# Patient Record
Sex: FEMALE | Race: BLACK OR AFRICAN AMERICAN | NOT HISPANIC OR LATINO | Employment: FULL TIME | URBAN - METROPOLITAN AREA
[De-identification: names, ages, dates, MRNs, and addresses within clinical notes are randomized per-mention and may not be internally consistent; named-entity substitution may affect disease eponyms.]

---

## 2022-03-19 ENCOUNTER — IMMUNIZATION (OUTPATIENT)
Dept: PRIMARY CARE CLINIC | Facility: CLINIC | Age: 52
End: 2022-03-19
Payer: COMMERCIAL

## 2022-03-19 DIAGNOSIS — Z23 NEED FOR VACCINATION: Primary | ICD-10-CM

## 2022-03-19 PROCEDURE — 91306 COVID-19, MRNA, LNP-S, PF, 100 MCG/0.25 ML DOSE VACCINE (MODERNA BOOSTER): CPT | Mod: PBBFAC | Performed by: INTERNAL MEDICINE

## 2022-03-19 PROCEDURE — 0064A COVID-19, MRNA, LNP-S, PF, 100 MCG/0.25 ML DOSE VACCINE (MODERNA BOOSTER): CPT | Mod: CV19,PBBFAC | Performed by: INTERNAL MEDICINE

## 2022-06-11 ENCOUNTER — CLINICAL SUPPORT (OUTPATIENT)
Dept: OTHER | Facility: CLINIC | Age: 52
End: 2022-06-11

## 2022-06-11 DIAGNOSIS — Z00.8 ENCOUNTER FOR OTHER GENERAL EXAMINATION: ICD-10-CM

## 2022-06-15 VITALS
SYSTOLIC BLOOD PRESSURE: 118 MMHG | BODY MASS INDEX: 27.85 KG/M2 | DIASTOLIC BLOOD PRESSURE: 80 MMHG | WEIGHT: 188 LBS | HEIGHT: 69 IN

## 2022-06-15 LAB
GLUCOSE SERPL-MCNC: 83 MG/DL (ref 60–140)
HDLC SERPL-MCNC: 67 MG/DL
POC CHOLESTEROL, TOTAL: 174 MG/DL
TRIGL SERPL-MCNC: 44 MG/DL

## 2022-09-17 ENCOUNTER — IMMUNIZATION (OUTPATIENT)
Dept: PRIMARY CARE CLINIC | Facility: CLINIC | Age: 52
End: 2022-09-17
Payer: COMMERCIAL

## 2022-09-17 ENCOUNTER — CLINICAL SUPPORT (OUTPATIENT)
Dept: OTHER | Facility: CLINIC | Age: 52
End: 2022-09-17

## 2022-09-17 DIAGNOSIS — Z23 NEED FOR VACCINATION: Primary | ICD-10-CM

## 2022-09-17 DIAGNOSIS — Z00.8 ENCOUNTER FOR OTHER GENERAL EXAMINATION: ICD-10-CM

## 2022-09-17 PROCEDURE — 0134A COVID-19, MRNA, LNP-S, BIVALENT BOOSTER, PF, 50 MCG/0.5 ML: CPT | Mod: CV19,PBBFAC | Performed by: INTERNAL MEDICINE

## 2022-09-17 PROCEDURE — 91313 COVID-19, MRNA, LNP-S, BIVALENT BOOSTER, PF, 50 MCG/0.5 ML: CPT | Mod: PBBFAC | Performed by: INTERNAL MEDICINE

## 2022-09-18 VITALS — SYSTOLIC BLOOD PRESSURE: 127 MMHG | DIASTOLIC BLOOD PRESSURE: 75 MMHG

## 2022-09-18 LAB
GLUCOSE SERPL-MCNC: 80 MG/DL (ref 60–140)
HDLC SERPL-MCNC: 73 MG/DL
POC CHOLESTEROL, LDL (DOCK): 99 MG/DL
POC CHOLESTEROL, TOTAL: 188 MG/DL
TRIGL SERPL-MCNC: 90 MG/DL

## 2023-03-11 ENCOUNTER — CLINICAL SUPPORT (OUTPATIENT)
Dept: OTHER | Facility: CLINIC | Age: 53
End: 2023-03-11
Payer: COMMERCIAL

## 2023-03-11 DIAGNOSIS — Z00.8 ENCOUNTER FOR OTHER GENERAL EXAMINATION: ICD-10-CM

## 2023-03-12 VITALS — DIASTOLIC BLOOD PRESSURE: 81 MMHG | SYSTOLIC BLOOD PRESSURE: 145 MMHG

## 2023-03-12 LAB
GLUCOSE SERPL-MCNC: 89 MG/DL (ref 60–140)
HDLC SERPL-MCNC: 67 MG/DL
POC CHOLESTEROL, TOTAL: 183 MG/DL
TRIGL SERPL-MCNC: 44 MG/DL

## 2023-04-06 ENCOUNTER — CLINICAL SUPPORT (OUTPATIENT)
Dept: OTHER | Facility: CLINIC | Age: 53
End: 2023-04-06
Payer: COMMERCIAL

## 2023-04-06 DIAGNOSIS — Z00.8 ENCOUNTER FOR OTHER GENERAL EXAMINATION: ICD-10-CM

## 2023-04-06 PROCEDURE — 82947 PR  ASSAY QUANTITATIVE,BLOOD GLUCOSE: ICD-10-PCS | Mod: QW,S$GLB,, | Performed by: INTERNAL MEDICINE

## 2023-04-06 PROCEDURE — 99401 PR PREVENT COUNSEL,INDIV,15 MIN: ICD-10-PCS | Mod: S$GLB,,, | Performed by: INTERNAL MEDICINE

## 2023-04-06 PROCEDURE — 99401 PREV MED CNSL INDIV APPRX 15: CPT | Mod: S$GLB,,, | Performed by: INTERNAL MEDICINE

## 2023-04-06 PROCEDURE — 82947 ASSAY GLUCOSE BLOOD QUANT: CPT | Mod: QW,S$GLB,, | Performed by: INTERNAL MEDICINE

## 2023-04-06 PROCEDURE — 80061 PR  LIPID PANEL: ICD-10-PCS | Mod: QW,S$GLB,, | Performed by: INTERNAL MEDICINE

## 2023-04-06 PROCEDURE — 80061 LIPID PANEL: CPT | Mod: QW,S$GLB,, | Performed by: INTERNAL MEDICINE

## 2023-04-07 VITALS
BODY MASS INDEX: 30.07 KG/M2 | SYSTOLIC BLOOD PRESSURE: 142 MMHG | HEIGHT: 69 IN | WEIGHT: 203 LBS | DIASTOLIC BLOOD PRESSURE: 82 MMHG

## 2023-04-07 LAB
GLUCOSE SERPL-MCNC: 110 MG/DL (ref 60–140)
HDLC SERPL-MCNC: 65 MG/DL
POC CHOLESTEROL, TOTAL: 177 MG/DL
TRIGL SERPL-MCNC: 44 MG/DL

## 2024-09-14 ENCOUNTER — CLINICAL SUPPORT (OUTPATIENT)
Dept: OTHER | Facility: CLINIC | Age: 54
End: 2024-09-14

## 2024-09-14 DIAGNOSIS — Z00.8 ENCOUNTER FOR OTHER GENERAL EXAMINATION: ICD-10-CM

## 2024-09-17 LAB
GLUCOSE SERPL-MCNC: 106 MG/DL (ref 60–140)
HDLC SERPL-MCNC: 58 MG/DL
POC CHOLESTEROL, LDL (DOCK): 132 MG/DL
POC CHOLESTEROL, TOTAL: 200 MG/DL
TRIGL SERPL-MCNC: 54 MG/DL

## 2024-10-18 ENCOUNTER — LAB VISIT (OUTPATIENT)
Dept: LAB | Facility: HOSPITAL | Age: 54
End: 2024-10-18
Attending: STUDENT IN AN ORGANIZED HEALTH CARE EDUCATION/TRAINING PROGRAM
Payer: COMMERCIAL

## 2024-10-18 ENCOUNTER — OFFICE VISIT (OUTPATIENT)
Dept: PRIMARY CARE CLINIC | Facility: CLINIC | Age: 54
End: 2024-10-18
Payer: COMMERCIAL

## 2024-10-18 VITALS
BODY MASS INDEX: 29.71 KG/M2 | WEIGHT: 200.63 LBS | DIASTOLIC BLOOD PRESSURE: 80 MMHG | HEART RATE: 87 BPM | OXYGEN SATURATION: 97 % | SYSTOLIC BLOOD PRESSURE: 138 MMHG | TEMPERATURE: 99 F | HEIGHT: 69 IN

## 2024-10-18 DIAGNOSIS — Z13.1 SCREENING FOR DIABETES MELLITUS: ICD-10-CM

## 2024-10-18 DIAGNOSIS — Z80.3 FAMILY HISTORY OF BREAST CANCER IN SISTER: ICD-10-CM

## 2024-10-18 DIAGNOSIS — Z12.31 SCREENING MAMMOGRAM FOR BREAST CANCER: ICD-10-CM

## 2024-10-18 DIAGNOSIS — Z12.4 SCREENING FOR CERVICAL CANCER: ICD-10-CM

## 2024-10-18 DIAGNOSIS — Z13.220 SCREENING FOR HYPERLIPIDEMIA: ICD-10-CM

## 2024-10-18 DIAGNOSIS — I10 PRIMARY HYPERTENSION: ICD-10-CM

## 2024-10-18 DIAGNOSIS — Z00.00 ENCOUNTER FOR PREVENTATIVE ADULT HEALTH CARE EXAMINATION: Primary | ICD-10-CM

## 2024-10-18 DIAGNOSIS — Z00.00 ENCOUNTER FOR PREVENTATIVE ADULT HEALTH CARE EXAMINATION: ICD-10-CM

## 2024-10-18 LAB
ALBUMIN SERPL BCP-MCNC: 3.9 G/DL (ref 3.5–5.2)
ALP SERPL-CCNC: 65 U/L (ref 40–150)
ALT SERPL W/O P-5'-P-CCNC: 18 U/L (ref 10–44)
ANION GAP SERPL CALC-SCNC: 9 MMOL/L (ref 8–16)
AST SERPL-CCNC: 18 U/L (ref 10–40)
BILIRUB SERPL-MCNC: 0.5 MG/DL (ref 0.1–1)
BUN SERPL-MCNC: 10 MG/DL (ref 6–20)
CALCIUM SERPL-MCNC: 9.4 MG/DL (ref 8.7–10.5)
CHLORIDE SERPL-SCNC: 107 MMOL/L (ref 95–110)
CHOLEST SERPL-MCNC: 178 MG/DL (ref 120–199)
CHOLEST/HDLC SERPL: 3 {RATIO} (ref 2–5)
CO2 SERPL-SCNC: 23 MMOL/L (ref 23–29)
CREAT SERPL-MCNC: 0.9 MG/DL (ref 0.5–1.4)
ERYTHROCYTE [DISTWIDTH] IN BLOOD BY AUTOMATED COUNT: 13.2 % (ref 11.5–14.5)
EST. GFR  (NO RACE VARIABLE): >60 ML/MIN/1.73 M^2
ESTIMATED AVG GLUCOSE: 105 MG/DL (ref 68–131)
GLUCOSE SERPL-MCNC: 79 MG/DL (ref 70–110)
HBA1C MFR BLD: 5.3 % (ref 4–5.6)
HCT VFR BLD AUTO: 45.7 % (ref 37–48.5)
HDLC SERPL-MCNC: 59 MG/DL (ref 40–75)
HDLC SERPL: 33.1 % (ref 20–50)
HGB BLD-MCNC: 14.5 G/DL (ref 12–16)
LDLC SERPL CALC-MCNC: 110.8 MG/DL (ref 63–159)
MCH RBC QN AUTO: 30.7 PG (ref 27–31)
MCHC RBC AUTO-ENTMCNC: 31.7 G/DL (ref 32–36)
MCV RBC AUTO: 97 FL (ref 82–98)
NONHDLC SERPL-MCNC: 119 MG/DL
PLATELET # BLD AUTO: 218 K/UL (ref 150–450)
PMV BLD AUTO: 12.1 FL (ref 9.2–12.9)
POTASSIUM SERPL-SCNC: 4.1 MMOL/L (ref 3.5–5.1)
PROT SERPL-MCNC: 7.5 G/DL (ref 6–8.4)
RBC # BLD AUTO: 4.72 M/UL (ref 4–5.4)
SODIUM SERPL-SCNC: 139 MMOL/L (ref 136–145)
TRIGL SERPL-MCNC: 41 MG/DL (ref 30–150)
TSH SERPL DL<=0.005 MIU/L-ACNC: 1.22 UIU/ML (ref 0.4–4)
WBC # BLD AUTO: 4.46 K/UL (ref 3.9–12.7)

## 2024-10-18 PROCEDURE — 84443 ASSAY THYROID STIM HORMONE: CPT | Performed by: STUDENT IN AN ORGANIZED HEALTH CARE EDUCATION/TRAINING PROGRAM

## 2024-10-18 PROCEDURE — 36415 COLL VENOUS BLD VENIPUNCTURE: CPT | Mod: PN | Performed by: STUDENT IN AN ORGANIZED HEALTH CARE EDUCATION/TRAINING PROGRAM

## 2024-10-18 PROCEDURE — 80053 COMPREHEN METABOLIC PANEL: CPT | Performed by: STUDENT IN AN ORGANIZED HEALTH CARE EDUCATION/TRAINING PROGRAM

## 2024-10-18 PROCEDURE — 80061 LIPID PANEL: CPT | Performed by: STUDENT IN AN ORGANIZED HEALTH CARE EDUCATION/TRAINING PROGRAM

## 2024-10-18 PROCEDURE — 85027 COMPLETE CBC AUTOMATED: CPT | Performed by: STUDENT IN AN ORGANIZED HEALTH CARE EDUCATION/TRAINING PROGRAM

## 2024-10-18 PROCEDURE — 99999 PR PBB SHADOW E&M-EST. PATIENT-LVL V: CPT | Mod: PBBFAC,,, | Performed by: STUDENT IN AN ORGANIZED HEALTH CARE EDUCATION/TRAINING PROGRAM

## 2024-10-18 PROCEDURE — 83036 HEMOGLOBIN GLYCOSYLATED A1C: CPT | Performed by: STUDENT IN AN ORGANIZED HEALTH CARE EDUCATION/TRAINING PROGRAM

## 2024-10-18 RX ORDER — NORETHINDRONE 0.35 MG/1
1 TABLET ORAL DAILY
COMMUNITY

## 2024-10-18 NOTE — PATIENT INSTRUCTIONS
For high blood pressure     Try DASH diet  Try to limit daily sodium to 2,000 mg   Limit alcohol intake and caffeine intake   Exercise at least 90 minutes/week       [email or web address]

## 2024-10-18 NOTE — PROGRESS NOTES
Primary Care  Annual Office Visit - In Person  10/18/2024          Patient is a 54 y.o.   Dayanara Guo  has no past medical history on file.    History of Present Illness    CHIEF COMPLAINT:  Patient presents today for annual check-up.    HYPERTENSION:  She monitors her blood pressure at home and has been mindful of her salt intake. She walks around her large yard for exercise but no longer has access to a previously used walking trail. She understands that her blood pressure may naturally increase with age.    MEDICATIONS:  She takes birth control for fibroid management as recommended by her previous doctor, but expresses concern about potential cancer risk associated with its use.    FAMILY HISTORY:  Her sister was diagnosed with breast cancer at age 40 and underwent bilateral mastectomy. She is now 54. They have different fathers.  No other family history of cancer reported.    SOCIAL HISTORY:  She works as a .    DIET AND LIFESTYLE:  She denies alcohol consumption     PREVENTIVE CARE:  Recent Cologuard test for colon cancer screening was normal.     PAST MEDICAL HISTORY:  She previously received care at [facility]      Active Medications  Current Outpatient Medications   Medication Instructions    BIOTIN ORAL Take by mouth.    CALCIUM CARBONATE/VITAMIN D3 (CALCIUM 600 + D,3, ORAL) Take by mouth.    influenza (FLUARIX TRIV 5846-1296, PF,) 45 mcg (15 mcg x 3)/0.5 mL IM vaccine (> or = 6 mo) 0.5 mLs, Intramuscular, Once    MULTIVITAMIN ORAL Take by mouth.    norethindrone (MICRONOR) 0.35 mg tablet 1 tablet, Daily       Annual Review of Preventative Care      Health Maintenance Due   Topic Date Due    Hepatitis C Screening  Never done    HIV Screening  Never done    Hemoglobin A1c (Diabetic Prevention Screening)  Never done    Colorectal Cancer Screening  Never done    Cervical Cancer Screening  05/16/2019    Mammogram  06/08/2024    Influenza Vaccine (1) 09/01/2024    COVID-19  "Vaccine (4 - 2024-25 season) 09/01/2024     Diabetes Screening:  No results found for: "LABGLYC", "HEMOGLOBINA1", "HGBA1C"  BP Readings from Last 3 Encounters:   10/18/24 138/80   09/14/24 (!) 151/81   04/06/23 (!) 142/82     Wt Readings from Last 3 Encounters:   10/18/24 0859 91 kg (200 lb 9.9 oz)   04/06/23 0930 92.1 kg (203 lb)   06/11/22 1057 85.3 kg (188 lb)     Colon Cancer Screening: TBD   Mammo: Next Due 2024  Pap: Next Due 2024          Physical Exam  Vitals reviewed.   Constitutional:       General: She is not in acute distress.  Eyes:      Pupils: Pupils are equal, round, and reactive to light.   Cardiovascular:      Rate and Rhythm: Normal rate and regular rhythm.      Pulses: Normal pulses.      Heart sounds: Normal heart sounds.   Pulmonary:      Effort: Pulmonary effort is normal.      Breath sounds: Normal breath sounds.   Abdominal:      General: Abdomen is flat. Bowel sounds are normal.      Palpations: Abdomen is soft.   Musculoskeletal:      Right lower leg: No edema.      Left lower leg: No edema.                   Assessment & Plan      Diagnosed hypertension based on multiple elevated blood pressure readings  Considered lifestyle modifications before initiating pharmacological treatment for hypertension  Evaluated current sodium intake and recommended reduction  Reviewed exercise habits and encouraged gradual increase in physical activity  Noted history of fibroids, previously managed with low-dose birth control  Considered family history of breast cancer in sister    HYPERTENSION:  - Patient to reduce sodium intake, being mindful of hidden sources in processed foods.  - Patient to bring wrist blood pressure cuff to next appointment for comparison with office readings.  - Follow up in 1 month for blood pressure check.  - If blood pressure remains elevated at follow-up, medication initiation will be considered.    EXERCISE MANAGEMENT:  - Discussed the importance of gradual increase in exercise " frequency.    BREAST CANCER SCREENING:  - Mammogram ordered.    COLON CANCER SCREENING:  - Will obtain records     ENCOUNTER FOR PREVENTIVE CARE:  - CBC  - BMP  - TSH    SCREENING HLD:  - Lipid panel     SCREENING DM:  - HbA1C    FLU VACCINATION:  - Flu vaccine     GYNECOLOGY REFERRAL:  - Referred to gynecology for establishment of care and Pap smear.           Orders Placed This Encounter    Mammo Digital Screening Bilat    Lipid Panel    Hemoglobin A1C    CBC Without Differential    Comprehensive Metabolic Panel    TSH    Ambulatory referral/consult to Gynecology                             Upcoming Scheduled Appointments and Follow Up:    Future Appointments   Date Time Provider Department Center   11/5/2024 10:00 AM [facility] [place]   11/27/2024  8:20 AM Cuong Buitrago MD [facility]   11/27/2024  9:40 AM Antione Goins NP [facility]       Follow Up [facility] (with who? when?): Follow up in about 1 month (around 11/18/2024) for 40 mins.        Extended Emergency Contact Information  Primary Emergency Contact: Razia Pierre   [place]  Mobile Phone: [phone]  Relation: Relative      Cuong Buitrago MD   Internal Medicine  10/18/2024 - 11:05 AM    I spent a total of 30 minutes on the day of the visit.This includes face to face time and non-face to face time preparing to see the patient (eg, review of tests), obtaining and/or reviewing separately obtained history, documenting clinical information in the electronic or other health record, independently interpreting results and communicating results to the patient/family/caregiver, or care coordinator.    This note was generated with the assistance of ambient listening technology. Verbal consent was obtained by the patient and accompanying visitor(s) for the recording of patient appointment to facilitate this note. I attest to having reviewed and edited the generated note for  accuracy, though some syntax or spelling errors may persist. Please contact the author of this note for any clarification.      While patients have the right to access their medical record, it is essential to recognize that progress notes primarily serve as a means of communication among healthcare professionals.

## 2024-11-05 ENCOUNTER — HOSPITAL ENCOUNTER (OUTPATIENT)
Dept: RADIOLOGY | Facility: HOSPITAL | Age: 54
Discharge: HOME | End: 2024-11-05
Attending: STUDENT IN AN ORGANIZED HEALTH CARE EDUCATION/TRAINING PROGRAM
Payer: COMMERCIAL

## 2024-11-05 VITALS — WEIGHT: 200 LBS | HEIGHT: 69 IN | BODY MASS INDEX: 29.62 KG/M2

## 2024-11-05 DIAGNOSIS — Z12.31 SCREENING MAMMOGRAM FOR BREAST CANCER: ICD-10-CM

## 2024-11-05 PROCEDURE — 77067 SCR MAMMO BI INCL CAD: CPT | Mod: TC

## 2024-11-22 ENCOUNTER — PATIENT MESSAGE (OUTPATIENT)
Dept: RESEARCH | Facility: HOSPITAL | Age: 54
End: 2024-11-22
Payer: COMMERCIAL

## 2024-11-26 ENCOUNTER — HOSPITAL ENCOUNTER (OUTPATIENT)
Dept: RADIOLOGY | Facility: HOSPITAL | Age: 54
Discharge: HOME | End: 2024-11-26
Attending: STUDENT IN AN ORGANIZED HEALTH CARE EDUCATION/TRAINING PROGRAM
Payer: COMMERCIAL

## 2024-11-26 DIAGNOSIS — R92.8 ABNORMAL MAMMOGRAM: ICD-10-CM

## 2024-11-26 PROCEDURE — 77066 DX MAMMO INCL CAD BI: CPT | Mod: 26,,, | Performed by: RADIOLOGY

## 2024-11-26 PROCEDURE — 77066 DX MAMMO INCL CAD BI: CPT | Mod: TC

## 2024-11-26 PROCEDURE — 77062 BREAST TOMOSYNTHESIS BI: CPT | Mod: 26,,, | Performed by: RADIOLOGY

## 2024-11-26 PROCEDURE — 76642 ULTRASOUND BREAST LIMITED: CPT | Mod: TC,50

## 2024-11-26 PROCEDURE — 76642 ULTRASOUND BREAST LIMITED: CPT | Mod: 26,,, | Performed by: RADIOLOGY

## 2024-11-27 ENCOUNTER — PATIENT MESSAGE (OUTPATIENT)
Dept: RESEARCH | Facility: HOSPITAL | Age: 54
End: 2024-11-27
Payer: COMMERCIAL

## 2024-11-27 ENCOUNTER — OFFICE VISIT (OUTPATIENT)
Dept: OBSTETRICS AND GYNECOLOGY | Facility: CLINIC | Age: 54
End: 2024-11-27
Payer: COMMERCIAL

## 2024-11-27 ENCOUNTER — OFFICE VISIT (OUTPATIENT)
Dept: PRIMARY CARE CLINIC | Facility: CLINIC | Age: 54
End: 2024-11-27
Payer: COMMERCIAL

## 2024-11-27 VITALS
DIASTOLIC BLOOD PRESSURE: 81 MMHG | WEIGHT: 195.88 LBS | DIASTOLIC BLOOD PRESSURE: 76 MMHG | SYSTOLIC BLOOD PRESSURE: 132 MMHG | WEIGHT: 196.19 LBS | BODY MASS INDEX: 29.06 KG/M2 | BODY MASS INDEX: 28.93 KG/M2 | HEIGHT: 69 IN | SYSTOLIC BLOOD PRESSURE: 122 MMHG

## 2024-11-27 DIAGNOSIS — Z86.018 HISTORY OF UTERINE FIBROID: ICD-10-CM

## 2024-11-27 DIAGNOSIS — R45.86 MOOD CHANGES: ICD-10-CM

## 2024-11-27 DIAGNOSIS — Z12.4 ENCOUNTER FOR SCREENING FOR MALIGNANT NEOPLASM OF CERVIX: ICD-10-CM

## 2024-11-27 DIAGNOSIS — Z01.419 ENCOUNTER FOR GYNECOLOGICAL EXAMINATION: Primary | ICD-10-CM

## 2024-11-27 DIAGNOSIS — J30.2 SEASONAL ALLERGIC RHINITIS, UNSPECIFIED TRIGGER: Primary | ICD-10-CM

## 2024-11-27 DIAGNOSIS — Z80.3 FAMILY HISTORY OF BREAST CANCER: ICD-10-CM

## 2024-11-27 DIAGNOSIS — Z12.39 ENCOUNTER FOR SCREENING BREAST EXAMINATION: ICD-10-CM

## 2024-11-27 DIAGNOSIS — N95.1 PERIMENOPAUSE: ICD-10-CM

## 2024-11-27 DIAGNOSIS — I10 PRIMARY HYPERTENSION: ICD-10-CM

## 2024-11-27 PROCEDURE — 99214 OFFICE O/P EST MOD 30 MIN: CPT | Mod: S$GLB,,, | Performed by: STUDENT IN AN ORGANIZED HEALTH CARE EDUCATION/TRAINING PROGRAM

## 2024-11-27 PROCEDURE — 3078F DIAST BP <80 MM HG: CPT | Mod: CPTII,S$GLB,, | Performed by: STUDENT IN AN ORGANIZED HEALTH CARE EDUCATION/TRAINING PROGRAM

## 2024-11-27 PROCEDURE — 99999 PR PBB SHADOW E&M-EST. PATIENT-LVL III: CPT | Mod: PBBFAC,,, | Performed by: STUDENT IN AN ORGANIZED HEALTH CARE EDUCATION/TRAINING PROGRAM

## 2024-11-27 PROCEDURE — 3074F SYST BP LT 130 MM HG: CPT | Mod: CPTII,S$GLB,, | Performed by: STUDENT IN AN ORGANIZED HEALTH CARE EDUCATION/TRAINING PROGRAM

## 2024-11-27 PROCEDURE — 3008F BODY MASS INDEX DOCD: CPT | Mod: CPTII,S$GLB,, | Performed by: STUDENT IN AN ORGANIZED HEALTH CARE EDUCATION/TRAINING PROGRAM

## 2024-11-27 PROCEDURE — 3044F HG A1C LEVEL LT 7.0%: CPT | Mod: CPTII,S$GLB,, | Performed by: STUDENT IN AN ORGANIZED HEALTH CARE EDUCATION/TRAINING PROGRAM

## 2024-11-27 PROCEDURE — 99999 PR PBB SHADOW E&M-EST. PATIENT-LVL IV: CPT | Mod: PBBFAC,,, | Performed by: NURSE PRACTITIONER

## 2024-11-27 RX ORDER — LEVOCETIRIZINE DIHYDROCHLORIDE 5 MG/1
5 TABLET, FILM COATED ORAL NIGHTLY PRN
Qty: 30 TABLET | Refills: 11 | Status: SHIPPED | OUTPATIENT
Start: 2024-11-27

## 2024-11-27 RX ORDER — PAROXETINE HYDROCHLORIDE 20 MG/1
20 TABLET, FILM COATED ORAL EVERY MORNING
Qty: 30 TABLET | Refills: 11 | Status: SHIPPED | OUTPATIENT
Start: 2024-11-27 | End: 2025-11-27

## 2024-11-27 RX ORDER — FLUTICASONE PROPIONATE 50 MCG
1 SPRAY, SUSPENSION (ML) NASAL DAILY PRN
Qty: 15.8 ML | Refills: 1 | Status: SHIPPED | OUTPATIENT
Start: 2024-11-27

## 2024-11-27 NOTE — PROGRESS NOTES
HISTORY OF PRESENT ILLNESS:    Dayanara Guo is a 54 y.o. female, , Patient's last menstrual period was 2024 (approximate).,  presents for a routine exam. Patient reports cycles are sporadic lasting 4 days with some spotting, lighter in flow. Pt reports hot flashes, not too bothersome. Pt also reports mood swings. Pt denies breast, urinary or other gyn issues.    Last Pap: 2016- NILM  History of abnormal pap: No   Last Mammogram: 2024- right breast: lower outer quadrant 08:00 o'clock oval simple cyst measuring 1.0 cm long axis & 10:00 o'clock axis 4 cm from the nipple oval simple cyst measuring 2.7 cm long axis; left breast: 04:00 o'clock axis 5 cm from the nipple oval simple cyst measuring 1.1 cm & 04:00 o'clock axis 6 cm from the nipple oval simple cyst measuring 1.1 cm long; Benign (BIRADS 2), TC score: 29.1%    Fam hx of breast or ovarian cancer: Sister (breast)  Last Colonoscopy: Cologuard - negative    She uses abstinence for birth control.   She does not desire STD screening.    The patient participates in regular exercise: yes  The patient does not smoke.    The patient wears seatbelts.     Pt denies any domestic violence.    History reviewed. No pertinent past medical history.    Past Surgical History:   Procedure Laterality Date     SECTION       MEDICATIONS AND ALLERGIES:    Current Outpatient Medications:     ascorbic acid, vitamin C, (VITAMIN C) 100 MG tablet, Take 100 mg by mouth once daily., Disp: , Rfl:     CALCIUM CARBONATE/VITAMIN D3 (CALCIUM 600 + D,3, ORAL), Take by mouth., Disp: , Rfl:     fluticasone propionate (FLONASE) 50 mcg/actuation nasal spray, 1 spray (50 mcg total) by Each Nostril route daily as needed for Rhinitis., Disp: 15.8 mL, Rfl: 1    levocetirizine (XYZAL) 5 MG tablet, Take 1 tablet (5 mg total) by mouth nightly as needed for Allergies., Disp: 30 tablet, Rfl: 11    MULTIVITAMIN ORAL, Take by mouth., Disp: , Rfl:     BIOTIN ORAL, Take by  mouth. (Patient not taking: Reported on 11/27/2024), Disp: , Rfl:     norethindrone (MICRONOR) 0.35 mg tablet, Take 1 tablet by mouth once daily. (Patient not taking: Reported on 11/27/2024), Disp: , Rfl:     paroxetine (PAXIL) 20 MG tablet, Take 1 tablet (20 mg total) by mouth every morning., Disp: 30 tablet, Rfl: 11    Review of patient's allergies indicates:  No Known Allergies    Family History   Problem Relation Name Age of Onset    Arthritis Mother      Breast cancer Sister       Social History     Socioeconomic History    Marital status: Single    Number of children: 2   Tobacco Use    Smoking status: Never   Substance and Sexual Activity    Alcohol use: No    Drug use: No    Sexual activity: Not Currently     Partners: Male     COMPREHENSIVE GYN HISTORY:  PAP History: Denies abnormal Paps.  Infection History: Denies STDs. Denies PID.  Benign History: Denies uterine fibroids. Denies ovarian cysts. Denies endometriosis. Denies other conditions.  Cancer History: Denies cervical cancer. Denies uterine cancer or hyperplasia. Denies ovarian cancer. Denies vulvar cancer or pre-cancer. Denies vaginal cancer or pre-cancer. Denies breast cancer. Denies colon cancer.  Sexual Activity History: Reports currently being sexually active  Menstrual History: Sporadic with light flow, spotting at times  Dysmenorrhea History: Mild cramping     ROS:  GENERAL: No weight changes. No swelling. No fatigue. No fever.  CARDIOVASCULAR: No chest pain. No shortness of breath. No leg cramps.   NEUROLOGICAL: No headaches. No vision changes.  BREASTS: No pain. No lumps. No discharge.  ABDOMEN: No pain. No nausea. No vomiting. No diarrhea. No constipation.  REPRODUCTIVE: No abnormal bleeding.   VULVA: No pain. No lesions. No itching.  VAGINA: No relaxation. No itching. No odor. No discharge. No lesions.  URINARY: No incontinence. No nocturia. No frequency. No dysuria.    /81 (BP Location: Left arm, Patient Position: Sitting)   Wt 88.9  kg (195 lb 14.1 oz)   LMP 11/13/2024 (Approximate)   BMI 28.93 kg/m²     PE:  APPEARANCE: Well nourished, well developed, in no acute distress.  AFFECT: WNL, alert and oriented x 3.  SKIN: No acne or hirsutism.  NECK: Neck symmetric, without masses or thyromegaly.  NODES: No inguinal, cervical, axillary or femoral lymph node enlargement.  CHEST: Good respiratory effort.   ABDOMEN: Soft. No tenderness or masses. No hepatosplenomegaly. No hernias.  BREASTS: Symmetrical, no skin changes, visible lesions, palpable masses or nipple discharge bilaterally.  PELVIC: External female genitalia without lesions.  Female hair distribution. Adequate perineal body, Normal urethral meatus. Vagina moist and well rugated without lesions or discharge.  No significant cystocele or rectocele present. Cervix pink without lesions, discharge or tenderness. Uterus is enlarged with hard large nodules noted, nontender. Adnexa without masses or tenderness. PAP SMEAR PERFORMED TODAY  EXTREMITIES: No edema    DIAGNOSIS:  1. Encounter for gynecological examination    2. Encounter for screening breast examination    3. Encounter for screening for malignant neoplasm of cervix    4. Family history of breast cancer    5. Perimenopause    6. Mood changes    7. History of uterine fibroid      PLAN:  -Pap smear performed today, A.C.S. pap guidelines discussed (every 3 yrs with hx of normal paps).   -CBE performed today. Mammogram up-to-date. Recommend CBE yearly and encouraged breast self-exam/awareness. Pt verbalized understanding. Sent referral to high risk breast clinic for elevated TC score/fam hx of breast cancer  -BC: abstinence  -Cologuard performed this year; repeat in 2027  -Uterine fibroid: discussed pelvic U/S, pt will request order in 6 months  -Vasomotor symptoms of perimenopause/mood changes: will send paxil to pharmacy; discussed benefits, risks, side effects, proper use    Orders Placed This Encounter    HPV High Risk Genotypes, PCR     Ambulatory referral/consult to [facility] Clinic ([facility])    Liquid-Based Pap Smear, Screening    paroxetine (PAXIL) 20 MG tablet     COUNSELING:  A.C.S. pap guidelines discussed (every 3 yrs with hx of normal paps). Recommend yearly mammograms.     FOLLOW-UP with me annually and follow up with PCP for other health maintenance.    PROBLEM VISIT INFO:  Dx: Vasomotor sym of perimenopause, Mood changes: Dx codes: N95.1, R45.86; Rx: sent paxil to pharmacy; CPT code: 82019-94 modifier    Antione Goins DNP, RN, APRN, WHNP-BC  [facility]  Ob/Gyn Department- [facility]

## 2024-11-27 NOTE — Clinical Note
Patient completed FOBT  Performed at:  01 - [facility] Specimen Collected: 03/10/24

## 2024-11-27 NOTE — PROGRESS NOTES
"Primary Care  Office Visit - In Person  11/27/2024      HPI    Patient is a 54 y.o.   Dayanara Guo  has no past medical history on file.    History of Present Illness    CHIEF COMPLAINT:  Patient presents today for follow-up for elevated BP     PREVENTIVE HEALTH:  She underwent a 3-D mammogram and ultrasound due to dense breast tissue. She also completed a home stool test for colon cancer screening    ALLERGIES:  She reports sinus problems and requested allergy medication. She mentioned Flonase as a potential option.           Active Medications:  Current Outpatient Medications   Medication Instructions    ascorbic acid (vitamin C) (VITAMIN C) 100 mg, Daily    BIOTIN ORAL Take by mouth.    CALCIUM CARBONATE/VITAMIN D3 (CALCIUM 600 + D,3, ORAL) Take by mouth.    fluticasone propionate (FLONASE) 50 mcg, Each Nostril, Daily PRN    levocetirizine (XYZAL) 5 mg, Oral, Nightly PRN    MULTIVITAMIN ORAL Take by mouth.    norethindrone (MICRONOR) 0.35 mg tablet 1 tablet, Daily    paroxetine (PAXIL) 20 mg, Oral, Every morning       Vitals:    11/27/24 0820 11/27/24 0900   BP: 138/84 122/76   BP Location: Left arm  Comment: home cuff left wrist Left arm   Patient Position: Sitting Sitting   Weight: 89 kg (196 lb 3.4 oz)    Height: 5' 9" (1.753 m)        Physical Exam  Vitals reviewed.   Constitutional:       General: She is not in acute distress.  HENT:      Right Ear: Tympanic membrane normal.      Left Ear: Tympanic membrane normal.      Nose:      Right Sinus: No maxillary sinus tenderness or frontal sinus tenderness.      Left Sinus: No maxillary sinus tenderness or frontal sinus tenderness.      Mouth/Throat:      Pharynx: Oropharynx is clear. No oropharyngeal exudate or posterior oropharyngeal erythema.   Cardiovascular:      Rate and Rhythm: Normal rate and regular rhythm.      Pulses: Normal pulses.      Heart sounds: Normal heart sounds.   Pulmonary:      Effort: Pulmonary effort is normal.      Breath sounds: " Normal breath sounds.   Abdominal:      General: Abdomen is flat. Bowel sounds are normal.      Palpations: Abdomen is soft.   Musculoskeletal:      Right lower leg: No edema.      Left lower leg: No edema.   Neurological:      General: No focal deficit present.      Mental Status: She is oriented to person, place, and time.            Assessment & Plan      Reviewed patient's lab results    HYPERTENSION:   Well controlled.  Continue to monitor    ALLERGIC RHINITIS:  Started Flonase nasal spray for allergy symptoms.    GENERAL FOLLOW-UP:  Follow up as needed, instructed to bring documentation of colon cancer screening from previous clinic.           Orders Placed This Encounter    levocetirizine (XYZAL) 5 MG tablet    fluticasone propionate (FLONASE) 50 mcg/actuation nasal spray         Previous labs, records, and notes reviewed and considered for their impact on clinical decision making today.                Upcoming Scheduled Appointments and Follow Up:    No future appointments.    Follow Up [facility] (with who? when?): Follow up in about 6 months (around 5/27/2025).      Extended Emergency Contact Information  Primary Emergency Contact: Razia Pierre   [place]  Mobile Phone: [phone]  Relation: Relative      Cuong Buitrago MD   Internal Medicine  11/27/2024 - 10:12 AM    I spent a total of 20 minutes on the day of the visit.This includes face to face time and non-face to face time preparing to see the patient (eg, review of tests), obtaining and/or reviewing separately obtained history, documenting clinical information in the electronic or other health record, independently interpreting results and communicating results to the patient/family/caregiver, or care coordinator.    This note was generated with the assistance of ambient listening technology. Verbal consent was obtained by the patient and accompanying visitor(s) for the recording of patient appointment to facilitate this  note. I attest to having reviewed and edited the generated note for accuracy, though some syntax or spelling errors may persist. Please contact the author of this note for any clarification.      While patients have the right to access their medical record, it is essential to recognize that progress notes primarily serve as a means of communication among healthcare professionals.

## 2025-09-05 DIAGNOSIS — Z12.11 SPECIAL SCREENING FOR MALIGNANT NEOPLASMS, COLON: Primary | ICD-10-CM
